# Patient Record
Sex: FEMALE | Race: WHITE | URBAN - METROPOLITAN AREA
[De-identification: names, ages, dates, MRNs, and addresses within clinical notes are randomized per-mention and may not be internally consistent; named-entity substitution may affect disease eponyms.]

---

## 2017-10-26 ENCOUNTER — OFFICE VISIT (OUTPATIENT)
Dept: ORTHOPEDIC SURGERY | Age: 52
End: 2017-10-26

## 2017-10-26 VITALS
WEIGHT: 140 LBS | HEIGHT: 66 IN | SYSTOLIC BLOOD PRESSURE: 113 MMHG | BODY MASS INDEX: 22.5 KG/M2 | HEART RATE: 77 BPM | DIASTOLIC BLOOD PRESSURE: 72 MMHG

## 2017-10-26 DIAGNOSIS — M75.01 ADHESIVE CAPSULITIS OF RIGHT SHOULDER: Primary | ICD-10-CM

## 2017-10-26 DIAGNOSIS — M25.511 RIGHT SHOULDER PAIN, UNSPECIFIED CHRONICITY: ICD-10-CM

## 2017-10-26 PROCEDURE — 99203 OFFICE O/P NEW LOW 30 MIN: CPT | Performed by: ORTHOPAEDIC SURGERY

## 2017-10-26 RX ORDER — INSULIN GLARGINE 100 [IU]/ML
INJECTION, SOLUTION SUBCUTANEOUS NIGHTLY
COMMUNITY

## 2017-10-26 RX ORDER — MELOXICAM 7.5 MG/1
15 TABLET ORAL DAILY
Qty: 30 TABLET | Refills: 3 | Status: SHIPPED | OUTPATIENT
Start: 2017-10-26

## 2017-10-26 RX ORDER — GABAPENTIN 100 MG/1
100 CAPSULE ORAL 3 TIMES DAILY
COMMUNITY

## 2017-10-26 RX ORDER — ESCITALOPRAM OXALATE 10 MG/1
10 TABLET ORAL DAILY
COMMUNITY

## 2017-10-26 RX ORDER — ATORVASTATIN CALCIUM 10 MG/1
10 TABLET, FILM COATED ORAL DAILY
COMMUNITY

## 2017-10-26 NOTE — LETTER
Shoulder Elbow Rehabilitation Referral    Patient Name: Melany Record      YOB: 1965    Diagnosis: Right shoulder adhesive capsulitis      Post Op Instructions:   Continuous passive motion (CPM)   Elbow range of motion   Exercise in plane of scapula    Strengthening      Pulley and instruction    Home exercise program (copy to patient)    Sling when arm at risk   Sling or brace at all times    AAROM: Forward elevation to              AAROM: External rotation  To       Isometric external rotator strengthening  AAROM: internal rotation: up the back   Isometric abductor strengthening   AAROM: Internal abduction      Isometric internal rotator strengthening  AAROM: cross-body adduction             Stretching:     Strengthening:   Four quadrant (FE, ER, IR, CBA)   Sleeper stretch     Rotator cuff (ER, IR, Abd)   Forward Elevation     External Rotators      External Rotation     Internal Rotators   Internal Rotation: up/back    Abductors      Internal Rotation: supine in abduction  Flexors   Cross-body abduction     Extensors   Pendulum (FE, Abd/Add, cw/ccw)   Scapular Stabilizers    Wall-walking (FE, Abd)     Shoulder shrugs      Table slides       Rhomboid pinch   Elbow (flex, ext, pron, sup)     Lat. Pull downs      Medial epicondylitis program    Forward punch    Lateral epicondylitis program    Internal rotators      Progressive resistive exercises   Bench Press         Bench press plus  Activities:      Lateral pull-downs   Rowing      Progressive two-hand supine press   Stepper/Exercise bike    Biceps: curls/supination   Swimming   Water exercises    Modalities: PRN    Return to Sport:   Ultrasound      Plyometrics   Iontophoresis     Rhythmic stabilization   Moist heat      Core strengthening    Massage      Sports specific program:   Cryotherapy       Electrical stimulation      Paraffin   Whirlpool   TENS     Home exercise program (copy to patient).       Supervised physical therapy Frequency:   1x week   2x week   3x week   Other:   Duration:  2 weeks    4 weeks   6 weeks   Other:     Sincerely,        Fide Orourke MD  Clinical Fellow in [address] [place] [address]  10/26/2017     This dictation was performed with a verbal recognition program [facility]) and it was checked for errors. It is possible that there are still dictated errors within this office note. If so, please bring any errors to my attention for an addendum. All efforts were made to ensure that this office note is accurate.

## 2017-10-26 NOTE — PROGRESS NOTES
Depomedrol     NDC#: 3039-5177-41  Lot: [id number]  Expiration Date: 1/20  Dose: 2cc  Location: injection was given in Right  shoulder      Lido    NDC#: 7013-8771-69   Lot:-[id number]  Expiration Date: 5/19  Dose: 8cc  Location: injection was given in Right shoulder

## 2017-10-26 NOTE — PROGRESS NOTES
Date:  10/26/2017    Name:  Xochitl Andrade  Address:  [address] [address]    :  1965      Age:   46 y.o.    SSN:  [id number]      Medical Record Number:  [id number]    Reason for Visit:    Chief Complaint    Shoulder Pain (Right shoulder pain )      DOS:10/26/2017     HPI: Xochitl Andrade is a 46 y.o. female here today for for right shoulder pain. The patient was referred to our clinic by her primary care physician. Patient states that the last 6 weeks she has had pain in her right shoulder that is started without any inciting event. The patient states that she has pain with external rotation activities as well as overhead activities. She also states that she has extreme pain at nights not allowing her to be able to sleep. She comes in today seeking evaluation and treatment. Pain Assessment  Location of Pain: Shoulder  Location Modifiers: Right  Severity of Pain: 6  Quality of Pain: Dull, Aching, Sharp  Duration of Pain: Persistent  Frequency of Pain: Constant  Aggravating Factors:  (Motion and trying to sleep )  Limiting Behavior: Yes  Relieving Factors: Rest  Result of Injury: No  Work-Related Injury: No  Are there other pain locations you wish to document?: No  ROS: All systems reviewed on patient intake form 10/26/17. Pertinent items are noted in HPI. Past Medical History:   Diagnosis Date    Cataract     Diabetes ([facility]     Neuropathy ([facility]         Past Surgical History:   Procedure Laterality Date    CHOLECYSTECTOMY      HYSTERECTOMY         History reviewed. No pertinent family history.     Social History     Social History    Marital status:      Spouse name: N/A    Number of children: N/A    Years of education: N/A     Social History Main Topics    Smoking status: Current Every Day Smoker     Packs/day: 0.50     Years: 30.00    Smokeless tobacco: Never Used    Alcohol use Yes    Drug use: No    Sexual activity: Not Asked     Other Topics Concern    None     Social History Narrative    None       Current Outpatient Prescriptions   Medication Sig Dispense Refill    insulin lispro (HUMALOG) 100 UNIT/ML injection vial Inject into the skin 3 times daily (before meals)      insulin glargine (LANTUS) 100 UNIT/ML injection vial Inject into the skin nightly      atorvastatin (LIPITOR) 10 MG tablet Take 10 mg by mouth daily      escitalopram (LEXAPRO) 10 MG tablet Take 10 mg by mouth daily      gabapentin (NEURONTIN) 100 MG capsule Take 100 mg by mouth 3 times daily      meloxicam (MOBIC) 7.5 MG tablet Take 2 tablets by mouth daily 30 tablet 3     No current facility-administered medications for this visit. No Known Allergies    Vital signs:  /72   Pulse 77   Ht 5' 6\" (1.676 m)   Wt 140 lb (63.5 kg)   BMI 22.60 kg/m²        Neuro: Alert & oriented x 3,  normal,  no focal deficits noted. Normal affect.   Eyes: sclera clear  Ears: Normal external ear  Mouth:  No perioral lesions  Pulm: Respirations unlabored and regular  Pulse: Regular rate    Skin: Warm, well perfused        Right shoulder exam    Inspection:  No gross deformities    Palpation: Tenderness bicipital groove    Active ROM: 100° forward flexion, 80° abduction, 20° external rotation while supine, and internal rotation to the level of L1 with pain    Passive ROM: 0° of internal rotation 40° of external rotation was supine    Strength: -4/5 strength testing in abduction in the scapular plane, -4/5 resisted external rotation with the elbow at the side    Stability:  no gross instability    Neurovascular: Neurovascularly intact    Special Tests: Positive Luis, patient lacking 15 cm with cross arm adduction test      Left comparison shoulder exam    Inspection:  No gross deformities    Palpation: No significant tenderness overlying the rotator cuff footprint, bicipital groove, or AC joint    Active ROM: 140° forward flexion, 20° abduction, and internal rotation to the above T4    Passive ROM: Similar    Stability:  no gross instability    Neurovascular: Neurovascularly intact    Special tests: patient lacking 10 cm with cross arm adduction test      Diagnostics:  Radiology:     X-rays of the right shoulder including Grashey, scapular Y and axillary views were obtained and reviewed in office:    Impression: Plain x-rays show no fracture, loose body or arthritic changes. MRI of the right shoulder reviewed in office:    Impression: Low-grade partial-thickness intrasubstance tear of the infraspinatus tendon with proximity near myotendinous junction. Assessment: Right shoulder adhesive capsulitis      Plan: We took the time to  the patient on her diagnosis as well as its linkage to diabetes, gender and age. The patient states good understanding of everything that was explained. Given that the patient's last hemoglobin A1c measurement was greater than 9 we will not offer the patient a intra-articular corticosteroid injection at today's visit. We'll refer the patient to physical therapy as well as give her a prescription for an oral anti-inflammatory in the form of Mobic. The patient will follow up in our clinic in 4 weeks to reassess her progress after therapy. Orders Placed This Encounter   Procedures    Ambulatory referral to Physical Therapy     Referral Priority:   Routine     Referral Type:   Eval and Treat     Referral Reason:   Specialty Services Required     Requested Specialty:   Physical Therapy     Number of Visits Requested:   [address]  Date:    10/26/2017    This dictation was performed with a verbal recognition program (DRAGON) and it was checked for errors. It is possible that there are still dictated errors within this office note. If so, please bring any errors to my attention for an addendum. All efforts were made to ensure that this office note is accurate. Attestation  I attest that my encounter today with patient Cecile Zuluaga  was supervised by Dr. David St  ______  I was physically present and personally supervised the Orthopaedic Sports Medicine Fellow in the evaluation and development of a treatment plan for this patient. I personally interviewed the patient and performed a physical examination. In addition, I discussed the patient's condition and treatment options with them. I have also reviewed and agree with the past medical, family and social history unless otherwise noted. All of the patient's questions were answered. Stephanie St MD, PhD  10/26/2017

## 2017-10-31 ENCOUNTER — EVALUATION (OUTPATIENT)
Dept: PHYSICAL THERAPY | Age: 52
End: 2017-10-31

## 2017-10-31 DIAGNOSIS — M75.81 ROTATOR CUFF TENDONITIS, RIGHT: ICD-10-CM

## 2017-10-31 DIAGNOSIS — M25.511 ACUTE PAIN OF RIGHT SHOULDER: Primary | ICD-10-CM

## 2017-10-31 DIAGNOSIS — M75.01 ADHESIVE CAPSULITIS OF RIGHT SHOULDER: ICD-10-CM

## 2017-10-31 PROCEDURE — G8981 BODY POS CURRENT STATUS: HCPCS | Performed by: PHYSICAL THERAPIST

## 2017-10-31 PROCEDURE — 97161 PT EVAL LOW COMPLEX 20 MIN: CPT | Performed by: PHYSICAL THERAPIST

## 2017-10-31 PROCEDURE — 97530 THERAPEUTIC ACTIVITIES: CPT | Performed by: PHYSICAL THERAPIST

## 2017-10-31 PROCEDURE — G8982 BODY POS GOAL STATUS: HCPCS | Performed by: PHYSICAL THERAPIST

## 2017-10-31 PROCEDURE — 97110 THERAPEUTIC EXERCISES: CPT | Performed by: PHYSICAL THERAPIST

## 2017-10-31 NOTE — FLOWSHEET NOTE
restriction. 5. BBIR R shoulder T 8 for functional reach     Progression Towards Functional goals:  [] Patient is progressing as expected towards functional goals listed. [] Progression is slowed due to complexities listed. [] Progression has been slowed due to co-morbidities.   [x] Plan just implemented, too soon to assess goals progression  [] Other:     ASSESSMENT:  See eval    Treatment/Activity Tolerance:  [] Patient tolerated treatment well [] Patient limited by fatique  [x] Patient limited by pain  [] Patient limited by other medical complications  [] Other:     Prognosis: [x] Good [] Fair  [] Poor    Patient Requires Follow-up: [x] Yes  [] No    PLAN: See eval    With maternity leave transition to [employer]ral    [] Continue per plan of care [] Alter current plan (see comments)  [x] Plan of care initiated [] Hold pending MD visit [] Discharge    Electronically signed by: Randi Boland PT, DPT    Physical Therapist [id number] [address] license #[id number]  Physical Therapist [place] license #[id number]

## 2017-10-31 NOTE — PLAN OF CARE
[facility]   Phone: [phone]    Fax: [phone]        Physical Therapy Certification    Dear Referring Practitioner: Dr Asad Alarcon ,    We had the pleasure of evaluating the following patient for physical therapy services at [address] [place] [address]. A summary of our findings can be found in the initial assessment below. This includes our plan of care. If you have any questions or concerns regarding these findings, please do not hesitate to contact me at the office phone number checked above. Thank you for the referral.       Physician Signature:_______________________________Date:__________________  By signing above (or electronic signature), therapists plan is approved by physician      Patient: Thien Markham   : 1965   MRN: [id number]  Referring Physician: Referring Practitioner: Dr Asad Alarcon       Evaluation Date: 10/31/2017      Medical Diagnosis Information:  Diagnosis: R shoulder pain (M25.511), R shoulder tendonitis (M75.81), R shoulder adhesive capsulitis IM75.01)                                             Insurance information: PT Insurance Information: BCBS     Precautions/ Contra-indications: none   Latex Allergy:  [x]NO      []YES  Preferred Language for Healthcare:   [x]English       []other:    SUBJECTIVE: Patient stated complaint:R shoulder pain. Insidious onset with pain present for 6 weeks. Notes discomfort with abduction activities and OH activities. She also complains of pain interrupting sleeping. Imaging was completed that included x-ray that was non remarkable and MRI showing: Low-grade partial-thickness intrasubstance tear of the infraspinatus tendon with proximity near myotendinous junction. Pain with sleeping; pain with reaching out to the side and reaching overhead. Pressure in her shoulder. Patient works as  and shoulder interferes with work activities. Patient is L handed.  Secondary to diabetes patient was not given cortisone injection. Main complaint is pain and started in her upper arm and moved into her upper neck. Pain with reaching behind her back    Relevant Medical History:none. Neuropathy in feet, hands from type 1 diabetes   Functional Disability Index:PT G-Codes  Functional Assessment Tool Used: UEFI  Score: 30% deficit   Functional Limitation: Changing and maintaining body position  Changing and Maintaining Body Position Current Status (): At least 20 percent but less than 40 percent impaired, limited or restricted  Changing and Maintaining Body Position Goal Status (): 0 percent impaired, limited or restricted    Pain Scale: 2/10 currently; pain is 10/10 with reaching activities. Easing factors: resting   Provocative factors: reaching overhead and out to the side. Type: [x]Constant   []Intermittent  []Radiating [x]Localized []other:     Numbness/Tingling: none in shoulder     Occupation/School:      Living Status/Prior Level of Function: Independent with ADLs and IADLs, pain free reaching behind her back and OH activities.      OBJECTIVE:     CERV ROM     Cervical Flexion Chin to chest    Cervical Extension Eyes to ceiling    Cervical SB     Cervical rotation [facility]         AROM Left Right   Shoulder Flex 165 145   Shoulder Abd 168 90   Shoulder ER     Shoulder IR T 7 T 12                  Strength  Left Right   Shoulder Flex     Shoulder Scap     Shoulder ER 4+/5 4-/5   Shoulder IR 4+/5 4-/5               PROM Left Right   Shoulder Flex 170 131   Shoulder Abd 168 142   Shoulder ER 90 45   Shoulder IR 46 33      Reflexes/Sensation:    [x]Dermatomes/Myotomes intact    [x]Reflexes equal and normal bilaterally   []Other:    Joint mobility:    []Normal    [x]Hypo   []Hyper    Palpation: TTP along joint line     Functional Mobility/Transfers: normal     Posture: rounded shoulders     Bandages/Dressings/Incisions: none     Gait: (include devices/WB status): WNL    Orthopedic Special Tests: cross body adduction restricted. [x] Patient history, allergies, meds reviewed. Medical chart reviewed. See intake form. Review Of Systems (ROS):  [x]Performed Review of systems (Integumentary, CardioPulmonary, Neurological) by intake and observation. Intake form has been scanned into medical record. Patient has been instructed to contact their primary care physician regarding ROS issues if not already being addressed at this time. Co-morbidities/Complexities (which will affect course of rehabilitation):   []None           Arthritic conditions   []Rheumatoid arthritis (M05.9)  []Osteoarthritis (M19.91)   Cardiovascular conditions   []Hypertension (I10)  []Hyperlipidemia (E78.5)  []Angina pectoris (I20)  []Atherosclerosis (I70)   Musculoskeletal conditions   []Disc pathology   []Congenital spine pathologies   []Prior surgical intervention  []Osteoporosis (M81.8)  []Osteopenia (M85.8)   Endocrine conditions   []Hypothyroid (E03.9)  []Hyperthyroid Gastrointestinal conditions   []Constipation (R41.50)   Metabolic conditions   []Morbid obesity (E66.01)  [x]Diabetes type 1(E10.65) or 2 (E11.65)   []Neuropathy (G60.9)     Pulmonary conditions   []Asthma (J45)  []Coughing   []COPD (J44.9)   Psychological Disorders  [x]Anxiety (F41.9)  []Depression (F32.9)   []Other:   []Other:          Barriers to/and or personal factors that will affect rehab potential:              []Age  []Sex              []Motivation/Lack of Motivation                        [x]Co-Morbidities              []Cognitive Function, education/learning barriers              []Environmental, home barriers              []profession/work barriers  []past PT/medical experience  []other:  Justification: poster child for adhesive capsulitis      Falls Risk Assessment (30 days):   [x] Falls Risk assessed and no intervention required.   [] Falls Risk assessed and Patient requires intervention due to being higher risk   TUG score (>12s at risk):     [] Falls education provided, including       G-Codes:  PT G-Codes  Functional Assessment Tool Used: UEFI  Score: 30% deficit   Functional Limitation: Changing and maintaining body position  Changing and Maintaining Body Position Current Status (): At least 20 percent but less than 40 percent impaired, limited or restricted  Changing and Maintaining Body Position Goal Status (): 0 percent impaired, limited or restricted    ASSESSMENT: 46 y.o. female with signs and symptoms consistent with Diagnosis: R shoulder pain (M25.511), R shoulder tendonitis (M75.81), R shoulder adhesive capsulitis IM75.01)  Presents with increased pain, decreased ROM and strength in R shoulder affecting functional reach with ADL's and IADL's. Would benefit from skilled therapy to address deficits for functional restoration.  Good rehab potential.     Functional Impairments   []Noted spinal or UE joint hypomobility   []Noted spinal or UE joint hypermobility   [x]Decreased UE functional ROM   [x]Decreased UE functional strength   []Abnormal reflexes/sensation/myotomal/dermatomal deficits   []Decreased RC/scapular/core strength and neuromuscular control   []other:      Functional Activity Limitations (from functional questionnaire and intake)   []Reduced ability to tolerate prolonged functional positions   []Reduced ability or difficulty with changes of positions or transfers between positions   []Reduced ability to maintain good posture and demonstrate good body mechanics with sitting, bending, and lifting   [x] Reduced ability or tolerance with driving and/or computer work   [x]Reduced ability to sleep   [x]Reduced ability to perform lifting, reaching, carrying tasks   [x]Reduced ability to tolerate impact through UE   [x]Reduced ability to reach behind back   []Reduced ability to  or hold objects   [x]Reduced ability to throw or toss an object   []other:    Participation Restrictions   [x]Reduced participation in self care activities   [x]Reduced participation in home management activities   [x]Reduced participation in work activities   []Reduced participation in social activities. []Reduced participation in sport/recreation activities. Classification:   []Signs/symptoms consistent with post-surgical status including decreased ROM, strength and function.   []Signs/symptoms consistent with joint sprain/strain   []Signs/symptoms consistent with shoulder impingement   []Signs/symptoms consistent with shoulder/elbow/wrist tendinopathy   []Signs/symptoms consistent with Rotator cuff tear   []Signs/symptoms consistent with labral tear   []Signs/symptoms consistent with postural dysfunction    []Signs/symptoms consistent with Glenohumeral IR Deficit - <45 degrees   []Signs/symptoms consistent with facet dysfunction of cervical/thoracic spine    []Signs/symptoms consistent with pathology which may benefit from Dry needling     [x]other: adhesive capsulitis     Prognosis/Rehab Potential:      []Excellent   [x]Good    []Fair   []Poor    Tolerance of evaluation/treatment:    []Excellent   [x]Good    []Fair   []Poor    Physical Therapy Evaluation Complexity Justification  [x] A history of present problem with:  [] no personal factors and/or comorbidities that impact the plan of care;  [x]1-2 personal factors and/or comorbidities that impact the plan of care  []3 personal factors and/or comorbidities that impact the plan of care  [x] An examination of body systems using standardized tests and measures addressing any of the following: body structures and functions (impairments), activity limitations, and/or participation restrictions;:  [] a total of 1-2 or more elements   [] a total of 3 or more elements   [x] a total of 4 or more elements   [x] A clinical presentation with:  [x] stable and/or uncomplicated characteristics   [] evolving clinical presentation with changing characteristics  []